# Patient Record
Sex: FEMALE | Race: WHITE | NOT HISPANIC OR LATINO | Employment: UNEMPLOYED | URBAN - METROPOLITAN AREA
[De-identification: names, ages, dates, MRNs, and addresses within clinical notes are randomized per-mention and may not be internally consistent; named-entity substitution may affect disease eponyms.]

---

## 2024-09-03 ENCOUNTER — TELEPHONE (OUTPATIENT)
Dept: PULMONOLOGY | Facility: CLINIC | Age: 59
End: 2024-09-03
Payer: MEDICARE

## 2024-09-03 ENCOUNTER — OFFICE VISIT (OUTPATIENT)
Dept: OPHTHALMOLOGY | Facility: CLINIC | Age: 59
End: 2024-09-03
Payer: MEDICARE

## 2024-09-03 DIAGNOSIS — H00.022 HORDEOLUM INTERNUM OF RIGHT LOWER EYELID: Primary | ICD-10-CM

## 2024-09-03 PROCEDURE — 99203 OFFICE O/P NEW LOW 30 MIN: CPT | Mod: S$PBB,,, | Performed by: OPTOMETRIST

## 2024-09-03 PROCEDURE — 99999 PR PBB SHADOW E&M-NEW PATIENT-LVL II: CPT | Mod: PBBFAC,,, | Performed by: OPTOMETRIST

## 2024-09-03 PROCEDURE — 99202 OFFICE O/P NEW SF 15 MIN: CPT | Mod: PBBFAC,PO | Performed by: OPTOMETRIST

## 2024-09-03 RX ORDER — ERYTHROMYCIN 5 MG/G
OINTMENT OPHTHALMIC
Qty: 3.5 G | Refills: 0 | Status: SHIPPED | OUTPATIENT
Start: 2024-09-03 | End: 2024-09-06 | Stop reason: ALTCHOICE

## 2024-09-03 RX ORDER — GABAPENTIN 300 MG/1
1 CAPSULE ORAL NIGHTLY
COMMUNITY
Start: 2024-05-03

## 2024-09-03 RX ORDER — AZELAIC ACID 0.15 G/G
1 GEL TOPICAL DAILY
COMMUNITY
Start: 2023-11-16

## 2024-09-03 RX ORDER — HYDROXYCHLOROQUINE SULFATE 200 MG/1
200 TABLET, FILM COATED ORAL DAILY
COMMUNITY
Start: 2023-10-23 | End: 2024-10-22

## 2024-09-03 RX ORDER — CLOBETASOL PROPIONATE 0.5 MG/G
1 CREAM TOPICAL 2 TIMES DAILY
COMMUNITY
Start: 2023-11-16 | End: 2024-11-15

## 2024-09-03 RX ORDER — GABAPENTIN 100 MG/1
100 CAPSULE ORAL DAILY
COMMUNITY
Start: 2024-05-03

## 2024-09-03 NOTE — PROGRESS NOTES
HPI     Follow-up            Comments: Pt reports for urgent care due to painful eye          Comments    Laterality: OD.   Pain Scale:  7/10 with a combination tenderness.   Onset:   9/2/2024  Discharge:   No.  A.M. Matting:  Yes OD in the morning.   Itch:   Yes OD.   Redness:   Yes OD and OD eyelid  Photophobia:   No.  Foreign body sensation:   Yes  OD.  Deep pain:   No. Pt reports most of her pain is localized on her waterline   and lower OD eyelid.   Previous occurrence:   Pt states she had a stye previously and needed   surgery to remove it.   Drops:   No.      Pt would like to follow up for routine eye exam due to being glaucoma   suspect at a previous optometrist clinic and her vision fluctuations   worsening since a year ago.              Last edited by Marybel Setve on 9/3/2024  9:36 AM.            Assessment /Plan     For exam results, see Encounter Report.    Hordeolum internum of right lower eyelid  -     erythromycin (ROMYCIN) ophthalmic ointment; Apply ointment to lids and lashes on right lower eyelid 3 times daily for 10 days.  Dispense: 3.5 g; Refill: 0    Start erythromycin jose TID with warm compress BID.   Discussed PO Augmentin if symptoms worsen.     RTC 2-3 days for lid f/u, sooner if any changes to vision or worsening symptoms.

## 2024-09-03 NOTE — TELEPHONE ENCOUNTER
----- Message from Octavia Camacho sent at 9/3/2024  8:41 AM CDT -----  Type:  Patient Return Call Requested    Who Called:Sarah  Does the patient know what this is regarding?:NP Appointment  Would the patient rather a call back or a response via [facility]? Callback  Best Call Back Number:[phone]  Additional Information: Pressure in lungs, SOB

## 2024-09-06 ENCOUNTER — OFFICE VISIT (OUTPATIENT)
Dept: OPHTHALMOLOGY | Facility: CLINIC | Age: 59
End: 2024-09-06
Payer: MEDICARE

## 2024-09-06 DIAGNOSIS — H00.022 HORDEOLUM INTERNUM OF RIGHT LOWER EYELID: Primary | ICD-10-CM

## 2024-09-06 PROCEDURE — 99212 OFFICE O/P EST SF 10 MIN: CPT | Mod: PBBFAC,PO | Performed by: OPTOMETRIST

## 2024-09-06 PROCEDURE — 99999 PR PBB SHADOW E&M-EST. PATIENT-LVL II: CPT | Mod: PBBFAC,,, | Performed by: OPTOMETRIST

## 2024-09-06 RX ORDER — NEOMYCIN SULFATE, POLYMYXIN B SULFATE, AND DEXAMETHASONE 3.5; 10000; 1 MG/G; [USP'U]/G; MG/G
OINTMENT OPHTHALMIC
Qty: 3.5 G | Refills: 0 | Status: SHIPPED | OUTPATIENT
Start: 2024-09-06 | End: 2024-09-13

## 2024-09-06 NOTE — PROGRESS NOTES
HPI     Follow-up            Comments: Pt reports for 3 days lid f/u          Comments    1. Plaquenil since 2020 infrequently or PRN  2.  Previously Glaucoma susp    Vision changes since last eye exam?: Pt states her VA has been stable and   feels it has improved since her last eye exam. Pt states she has been   using warm compresses on the lid and the erythromycin three times daily   per the instructions. Pt states that she is still feeling tenderness and   irritation around the OD lid which hasn't improved since her last vision.   Pt states that also her OD lid pain had lessened but due to tenderness   hasn't completely dissipated.     Any eye pain today: Pt denies any eye pain.     Other ocular symptoms: None noticed by pt.                  Last edited by Marybel Steve on 9/6/2024 10:09 AM.            Assessment /Plan     For exam results, see Encounter Report.    Hordeolum internum of right lower eyelid  -     neomycin-polymyxin-dexamethasone (MAXITROL) 3.5 mg/g-10,000 unit/g-0.1 % Oint; Apply ointment to lids and lashes on right eyelid for 3 times daily for 7 days.  Dispense: 3.5 g; Refill: 0    Eye pain improved  Slightly increased in size today.  Warm compress/lid hygiene discussed.   Switch from erythromycin jose to Maxitrol ointment 3 times daily to affected area for 7 days.   RTC with MD on procedure day for removal if no improvement with conservative treatment.